# Patient Record
(demographics unavailable — no encounter records)

---

## 2025-04-23 NOTE — HISTORY OF PRESENT ILLNESS
[No] : Patient does not have concerns regarding sex [Never active] : never active [FreeTextEntry1] : 22 yo presents for establishment of care. 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 She enjoys the analytical side of it, but is interested in pursuing medicine for her future studies. Pt is not currently sexually active, and has not been in the past. Denies any changes in mood, breast concerns, changes in menstruation, or gynecological changes. She follows w/ dermatologist for HS in armpit and groin region.  Denies having Gardasil vaccine.  Pt spends time reading and going to the gym. She recently had a weekend trip to [place] with friends.

## 2025-04-23 NOTE — END OF VISIT
[FreeTextEntry3] : I, Niharika Wilder, acted as a scribe on behalf of Dr. Jaky Cain M.D. on 04/23/2025.  All medical entries made by the scribe were at my, Dr. Jaky Cain M.D., direction and personally dictated by me on 04/23/2025. I have reviewed the chart and agree that the record accurately reflects my personal performance of the history, physical exam, assessment and plan. I have also personally directed, reviewed, and agreed with the chart.

## 2025-04-23 NOTE — PLAN
[FreeTextEntry1] : 22 yo for annual exam.  HCM: - Discussed Gardasil vaccine for HPV prevention, to start series today and complete with rn group - Pap smear today - Discussed BC as option for HS management - Pt to continue to f/u with dermatology for Hx HS   RTO in one year for annual exam/PRN.

## 2025-04-23 NOTE — PHYSICAL EXAM
[Appropriately responsive] : appropriately responsive [Alert] : alert [No Acute Distress] : no acute distress [No Lymphadenopathy] : no lymphadenopathy [Soft] : soft [Non-tender] : non-tender [Non-distended] : non-distended [No HSM] : No HSM [No Lesions] : no lesions [No Mass] : no mass [Oriented x3] : oriented x3 [Examination Of The Breasts] : a normal appearance [No Discharge] : no discharge [No Masses] : no breast masses were palpable [Labia Majora] : normal [Labia Minora] : normal [Normal] : normal [Uterine Adnexae] : normal [FreeTextEntry3] : No thyromegaly

## 2025-04-23 NOTE — HISTORY OF PRESENT ILLNESS
[No] : Patient does not have concerns regarding sex [Never active] : never active [FreeTextEntry1] : 24 yo presents for establishment of care. 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 She enjoys the analytical side of it, but is interested in pursuing medicine for her future studies. Pt is not currently sexually active, and has not been in the past. Denies any changes in mood, breast concerns, changes in menstruation, or gynecological changes. She follows w/ dermatologist for HS in armpit and groin region.  Denies having Gardasil vaccine.  Pt spends time reading and going to the gym. She recently had a weekend trip to [place] with friends.